# Patient Record
Sex: MALE | Race: WHITE | Employment: OTHER | URBAN - METROPOLITAN AREA
[De-identification: names, ages, dates, MRNs, and addresses within clinical notes are randomized per-mention and may not be internally consistent; named-entity substitution may affect disease eponyms.]

---

## 2019-02-28 ENCOUNTER — OFFICE VISIT (OUTPATIENT)
Dept: OPHTHALMOLOGY | Facility: CLINIC | Age: 74
End: 2019-02-28
Attending: OPHTHALMOLOGY
Payer: MEDICARE

## 2019-02-28 DIAGNOSIS — H40.9 GLAUCOMA: ICD-10-CM

## 2019-02-28 DIAGNOSIS — H25.12 NUCLEAR SENILE CATARACT OF LEFT EYE: ICD-10-CM

## 2019-02-28 DIAGNOSIS — Z96.1 PSEUDOPHAKIA OF RIGHT EYE: ICD-10-CM

## 2019-02-28 DIAGNOSIS — H40.51X3 SECONDARY GLAUCOMA OF RIGHT EYE, SEVERE STAGE: Primary | ICD-10-CM

## 2019-02-28 PROCEDURE — G0463 HOSPITAL OUTPT CLINIC VISIT: HCPCS | Mod: ZF

## 2019-02-28 PROCEDURE — 76514 ECHO EXAM OF EYE THICKNESS: CPT | Mod: ZF | Performed by: OPHTHALMOLOGY

## 2019-02-28 PROCEDURE — 92133 CPTRZD OPH DX IMG PST SGM ON: CPT | Mod: ZF | Performed by: OPHTHALMOLOGY

## 2019-02-28 PROCEDURE — 92083 EXTENDED VISUAL FIELD XM: CPT | Mod: ZF | Performed by: OPHTHALMOLOGY

## 2019-02-28 PROCEDURE — 92020 GONIOSCOPY: CPT | Mod: ZF | Performed by: OPHTHALMOLOGY

## 2019-02-28 RX ORDER — CLOPIDOGREL BISULFATE 75 MG/1
TABLET ORAL
COMMUNITY
Start: 2018-11-21

## 2019-02-28 RX ORDER — HYDROCHLOROTHIAZIDE 25 MG/1
25 TABLET ORAL
COMMUNITY
Start: 2018-10-03

## 2019-02-28 RX ORDER — SIMVASTATIN 20 MG
TABLET ORAL
COMMUNITY

## 2019-02-28 RX ORDER — LATANOPROST 50 UG/ML
1 SOLUTION/ DROPS OPHTHALMIC AT BEDTIME
Qty: 1 BOTTLE | Refills: 11 | Status: SHIPPED | OUTPATIENT
Start: 2019-02-28 | End: 2020-05-15

## 2019-02-28 RX ORDER — ASPIRIN 81 MG/1
81 TABLET ORAL
COMMUNITY

## 2019-02-28 ASSESSMENT — TONOMETRY
OS_IOP_MMHG: 10
IOP_METHOD: TONOPEN
IOP_METHOD: APPLANATION
OD_IOP_MMHG: 26
OD_IOP_MMHG: 24
IOP_METHOD: APPLANATION
OD_IOP_MMHG: 18
OS_IOP_MMHG: 12

## 2019-02-28 ASSESSMENT — VISUAL ACUITY
OD_PH_SC+: -1
OS_SC: 20/30
METHOD: SNELLEN - LINEAR
OS_PH_SC: 20/25
OD_PH_SC: 20/40
OS_PH_SC+: -2
OD_SC+: -1

## 2019-02-28 ASSESSMENT — SLIT LAMP EXAM - LIDS
COMMENTS: NORMAL
COMMENTS: NORMAL

## 2019-02-28 ASSESSMENT — EXTERNAL EXAM - LEFT EYE: OS_EXAM: NORMAL

## 2019-02-28 ASSESSMENT — CUP TO DISC RATIO
OD_RATIO: 0.9
OS_RATIO: 0.2

## 2019-02-28 ASSESSMENT — EXTERNAL EXAM - RIGHT EYE: OD_EXAM: NORMAL

## 2019-02-28 ASSESSMENT — CONF VISUAL FIELD
OD_SUPERIOR_NASAL_RESTRICTION: 1
METHOD: COUNTING FINGERS
OS_NORMAL: 1
OD_INFERIOR_NASAL_RESTRICTION: 1

## 2019-02-28 ASSESSMENT — GONIOSCOPY
OS_INFERIOR: 4
OD_SUPERIOR: 4
OS_NASAL: 4
OS_TEMPORAL: 4
OD_TEMPORAL: 4
OD_NASAL: 4
OS_SUPERIOR: 4
OD_INFERIOR: 4

## 2019-02-28 ASSESSMENT — PACHYMETRY
OD_CT(UM): 627
OS_CT(UM): 606

## 2019-02-28 NOTE — PATIENT INSTRUCTIONS
Will obtain old notes and visual field test from Dr. Vergara and Dr. Bowers.  Patient will start Latanoprost which is a teal top drop at bedtime in the RIGHT EYE ONLY.  Patient will return to clinic in 3-4 weeks with repeat IOP check and further discussion.        This drop may cause lash growth and some darkening of the skin around the eye.  It is also possible in a patient with a freckled iris or denzel eyes, that the iris could darken to brown with time, something that is not common but not reversible.

## 2019-02-28 NOTE — NURSING NOTE
Chief Complaint(s) and History of Present Illness(es)     Glaucoma Evaluation     In right eye.  Associated symptoms include Negative for dryness, eye pain, redness and tearing.              Comments     Pt here for a Glaucoma evaluation of RE. Pt states he had an injury to the RE about 10 years ago, and the IOP has gone up gradually through the years. Vision in RE is not good about 95% gone, LE fine no changes. Pt is not using any eye drops at the moment. RE is cloudy x 10 years or so.     Lee Ann Munoz, [facility] 9:38 AM February 28, 2019

## 2019-02-28 NOTE — PROGRESS NOTES
1)Secondary Glaucoma OD -- s/p Express Trab OD (in 2008 by Dr. Vergara per patient), s/p ?LPI OD -- K pachy: 627/606   Tmax: OD:50s   HVF: OD:[place] and OS: Fluct      CDR:0.9/0.2     HRT/OCT: OD:Severe RNFl thinning and OS:RNFL WNL   FHX of Glc: No     Gonio: open     Intolerant to:      Asthma/COPD: No  Steroid Use: No    Kidney Stones: No    Sulfa Allergy:  No    IOP targets:  2)PCIOL OD  3)NS OS -- on Plavix and ASA  4)H/O Blunt force injury with shelf bracket in 2008 -- was evaluated by Dr. Arana in ER, was then followed by Dr. Bowers prior to seeing Dr. Vergara for surgery per patient  5)H/O AAA s/p repair 2018   6)H/O Cerebral aneurysm s/p coil 2018     MD: last had eye exam in 2010 per patient    Will obtain old notes and visual field test from Dr. Vergara and Dr. Bowers.  Patient will start Latanoprost which is a teal top drop at bedtime in the RIGHT EYE ONLY.  Patient will return to clinic in 3-4 weeks with repeat IOP check and further discussion about next steps.        This drop may cause lash growth and some darkening of the skin around the eye.  It is also possible in a patient with a freckled iris or denzel eyes, that the iris could darken to brown with time, something that is not common but not reversible.      The patient will continue without drops.     The patient will return to clinic in 6 months [facility] right eye.     Brown Acevedo MD  Ophthalmology Resident, PGY-3  [facility]      Attending Physician Attestation:  Complete documentation of historical and exam elements from today's encounter can be found in the full encounter summary report (not reduplicated in this progress note). I personally obtained the chief complaint(s) and history of present illness.  I confirmed and edited as necessary the review of systems, past medical/surgical history, family history, social history, and examination findings as documented by others; and I examined the patient myself. I  personally reviewed the relevant tests, images, and reports as documented above. I formulated and edited as necessary the assessment and plan and discussed the findings and management plan with the patient and family.  - Loli Lindquist MD

## 2019-04-09 ENCOUNTER — OFFICE VISIT (OUTPATIENT)
Dept: OPHTHALMOLOGY | Facility: CLINIC | Age: 74
End: 2019-04-09
Attending: OPHTHALMOLOGY
Payer: MEDICARE

## 2019-04-09 DIAGNOSIS — Z96.1 PSEUDOPHAKIA OF RIGHT EYE: ICD-10-CM

## 2019-04-09 DIAGNOSIS — H25.12 NUCLEAR SENILE CATARACT OF LEFT EYE: ICD-10-CM

## 2019-04-09 DIAGNOSIS — H40.51X3 SECONDARY GLAUCOMA OF RIGHT EYE, SEVERE STAGE: Primary | ICD-10-CM

## 2019-04-09 PROCEDURE — G0463 HOSPITAL OUTPT CLINIC VISIT: HCPCS | Mod: ZF

## 2019-04-09 ASSESSMENT — EXTERNAL EXAM - LEFT EYE: OS_EXAM: NORMAL

## 2019-04-09 ASSESSMENT — CONF VISUAL FIELD
OS_NORMAL: 1
METHOD: COUNTING FINGERS
OD_INFERIOR_TEMPORAL_RESTRICTION: 3
OD_INFERIOR_NASAL_RESTRICTION: 1
OD_SUPERIOR_NASAL_RESTRICTION: 1
OD_SUPERIOR_TEMPORAL_RESTRICTION: 3

## 2019-04-09 ASSESSMENT — VISUAL ACUITY
OD_SC: 20/70
OS_SC: 20/25
METHOD: SNELLEN - LINEAR
OD_PH_SC: 20/60

## 2019-04-09 ASSESSMENT — SLIT LAMP EXAM - LIDS
COMMENTS: NORMAL
COMMENTS: NORMAL

## 2019-04-09 ASSESSMENT — TONOMETRY
IOP_METHOD: APPLANATION
OD_IOP_MMHG: 14
OS_IOP_MMHG: 11

## 2019-04-09 ASSESSMENT — EXTERNAL EXAM - RIGHT EYE: OD_EXAM: NORMAL

## 2019-04-09 NOTE — NURSING NOTE
Chief Complaints and History of Present Illnesses   Patient presents with     Glaucoma Follow-Up     6 week follow up Secondary Glaucoma OD      Chief Complaint(s) and History of Present Illness(es)     Glaucoma Follow-Up     Associated symptoms: Negative for dryness, eye pain and redness    Comments: 6 week follow up Secondary Glaucoma OD               Comments     Pt states vision is the same as last visit. No eye pain today.     Maurizio BOOKER April 9, 2019 12:14 PM

## 2019-04-09 NOTE — PATIENT INSTRUCTIONS
Patient will continue on Latanoprost which is a teal top drop at bedtime in the RIGHT EYE ONLY.  Patient will return to clinic in 3-4 months with repeat IOP check, visual field test (OD:LVC) and further discussion about next steps.        This drop may cause lash growth and some darkening of the skin around the eye.  It is also possible in a patient with a freckled iris or denzel eyes, that the iris could darken to brown with time, something that is not common but not reversible.

## 2019-04-09 NOTE — PROGRESS NOTES
1)Secondary Glaucoma OD -- s/p Express Trab OD (in 2008 by Dr. Vergara per patient), s/p ?LPI OD -- K pachy: 627/606   Tmax: OD:50s   HVF: OD:[place] and OS: Fluct      CDR:0.9/0.2     HRT/OCT: OD:Severe RNFl thinning and OS:RNFL WNL   FHX of Glc: No     Gonio: open     Intolerant to:      Asthma/COPD: No  Steroid Use: No    Kidney Stones: No    Sulfa Allergy:  No    IOP targets:  2)PCIOL OD  3)NS OS -- on Plavix and ASA  4)H/O Blunt force injury with shelf bracket in 2008 -- was evaluated by Dr. Arana in ER, was then followed by Dr. Bowers prior to seeing Dr. Vergara for surgery per patient  5)H/O AAA s/p repair 2018   6)H/O Cerebral aneurysm s/p coil 2018     MD: last had eye exam in 2010 per patient    Will obtain old notes and visual field test from Dr. Vergara and Dr. Bowers.  Patient will continue on Latanoprost which is a teal top drop at bedtime in the RIGHT EYE ONLY.  Patient will return to clinic in 3-4 months with repeat IOP check, visual field test (OD:LVC) and further discussion about next steps.        This drop may cause lash growth and some darkening of the skin around the eye.  It is also possible in a patient with a freckled iris or denzel eyes, that the iris could darken to brown with time, something that is not common but not reversible.      Attending Physician Attestation:  Complete documentation of historical and exam elements from today's encounter can be found in the full encounter summary report (not reduplicated in this progress note). I personally obtained the chief complaint(s) and history of present illness.  I confirmed and edited as necessary the review of systems, past medical/surgical history, family history, social history, and examination findings as documented by others; and I examined the patient myself. I personally reviewed the relevant tests, images, and reports as documented above. I formulated and edited as necessary the assessment and plan and discussed the  findings and management plan with the patient and family.  - Loli Lindquist MD

## 2019-08-13 ENCOUNTER — OFFICE VISIT (OUTPATIENT)
Dept: OPHTHALMOLOGY | Facility: CLINIC | Age: 74
End: 2019-08-13
Attending: OPHTHALMOLOGY
Payer: MEDICARE

## 2019-08-13 DIAGNOSIS — H25.12 NUCLEAR SENILE CATARACT OF LEFT EYE: ICD-10-CM

## 2019-08-13 DIAGNOSIS — H40.51X3 SECONDARY GLAUCOMA OF RIGHT EYE, SEVERE STAGE: Primary | ICD-10-CM

## 2019-08-13 DIAGNOSIS — Z96.1 PSEUDOPHAKIA OF RIGHT EYE: ICD-10-CM

## 2019-08-13 PROCEDURE — G0463 HOSPITAL OUTPT CLINIC VISIT: HCPCS | Mod: ZF

## 2019-08-13 PROCEDURE — 92083 EXTENDED VISUAL FIELD XM: CPT | Mod: ZF | Performed by: OPHTHALMOLOGY

## 2019-08-13 RX ORDER — DORZOLAMIDE HYDROCHLORIDE AND TIMOLOL MALEATE 20; 5 MG/ML; MG/ML
1 SOLUTION/ DROPS OPHTHALMIC 2 TIMES DAILY
Qty: 1 BOTTLE | Refills: 11 | Status: SHIPPED | OUTPATIENT
Start: 2019-08-13

## 2019-08-13 ASSESSMENT — REFRACTION_WEARINGRX
OD_CYLINDER: +0.75
OS_AXIS: 040
OD_SPHERE: PLANO
OS_CYLINDER: +0.50
OS_SPHERE: -0.50
SPECS_TYPE: MRX
OD_AXIS: 016

## 2019-08-13 ASSESSMENT — TONOMETRY
OS_IOP_MMHG: 13
OD_IOP_MMHG: 16
IOP_METHOD: APPLANATION
IOP_METHOD: APPLANATION
OD_IOP_MMHG: 20
IOP_METHOD: APPLANATION
OS_IOP_MMHG: 15
OD_IOP_MMHG: 19

## 2019-08-13 ASSESSMENT — CONF VISUAL FIELD
OD_SUPERIOR_TEMPORAL_RESTRICTION: 3
OS_NORMAL: 1
OD_INFERIOR_NASAL_RESTRICTION: 1
OD_SUPERIOR_NASAL_RESTRICTION: 1
OD_INFERIOR_TEMPORAL_RESTRICTION: 3

## 2019-08-13 ASSESSMENT — SLIT LAMP EXAM - LIDS
COMMENTS: NORMAL
COMMENTS: NORMAL

## 2019-08-13 ASSESSMENT — VISUAL ACUITY
OS_SC: 20/25
OD_SC: 20/70
OS_SC+: -1
OD_SC+: +1
OD_PH_SC: 20/60
OD_PH_SC+: -2
METHOD: SNELLEN - LINEAR

## 2019-08-13 ASSESSMENT — EXTERNAL EXAM - LEFT EYE: OS_EXAM: NORMAL

## 2019-08-13 ASSESSMENT — EXTERNAL EXAM - RIGHT EYE: OD_EXAM: NORMAL

## 2019-08-13 NOTE — NURSING NOTE
Chief Complaints and History of Present Illnesses   Patient presents with     Glaucoma Follow-Up     4 month follow up Secondary Glaucoma OD      Chief Complaint(s) and History of Present Illness(es)     Glaucoma Follow-Up     Comments: 4 month follow up Secondary Glaucoma OD               Comments     Pt states vision is the same as last visit. No eye pain today.  No flashes or floaters.    Maurizio BOOKER August 13, 2019 12:48 PM

## 2019-08-13 NOTE — PROGRESS NOTES
1)Secondary Glaucoma OD -- s/p Express Trab OD (in 2008 by Dr. Vergara per patient), s/p ?LPI OD -- K pachy: 627/606   Tmax: OD:50s   HVF: OD:[place] and OS: Fluct      CDR:0.9/0.2     HRT/OCT: OD:Severe RNFl thinning and OS:RNFL WNL   FHX of Glc: No     Gonio: open     Intolerant to:      Asthma/COPD: No  Steroid Use: No    Kidney Stones: No    Sulfa Allergy:  No    IOP targets:  2)PCIOL OD  3)NS OS -- on Plavix and ASA  4)H/O Blunt force injury with shelf bracket in 2008 -- was evaluated by Dr. Arana in ER, was then followed by Dr. Bowers prior to seeing Dr. Vergara for surgery per patient  5)H/O AAA s/p repair 2018   6)H/O Cerebral aneurysm s/p coil 2018     MD: last had eye exam in 2010 per patient    Will obtain old notes and visual field test from Dr. Vergara and Dr. Bowers.  Patient will continue on Latanoprost which is a teal top drop at bedtime in the RIGHT EYE ONLY.  Patient will return to clinic in 3-4 months with repeat IOP check, visual field test (OD:LVC), dilated eye exam and disc photos.  A long discussion of the risks, benefits, and alternatives including potential treatment and management options were had with patient and a decision was made to start Cosopt (Timolol/Dorzolamide) which is a blue top drop 2x/day (12 hours apart) in the RIGHT EYE ONLY.          This drop may cause lash growth and some darkening of the skin around the eye.  It is also possible in a patient with a freckled iris or denzel eyes, that the iris could darken to brown with time, something that is not common but not reversible.      Attending Physician Attestation:  Complete documentation of historical and exam elements from today's encounter can be found in the full encounter summary report (not reduplicated in this progress note). I personally obtained the chief complaint(s) and history of present illness.  I confirmed and edited as necessary the review of systems, past medical/surgical history, family history,  social history, and examination findings as documented by others; and I examined the patient myself. I personally reviewed the relevant tests, images, and reports as documented above. I formulated and edited as necessary the assessment and plan and discussed the findings and management plan with the patient and family.  - Loli Lindquist MD

## 2019-08-13 NOTE — PATIENT INSTRUCTIONS
Patient will continue on Latanoprost which is a teal top drop at bedtime in the RIGHT EYE ONLY.  Patient will return to clinic in 3-4 months with repeat IOP check, visual field test (OD:LVC), dilated eye exam and disc photos.  A long discussion of the risks, benefits, and alternatives including potential treatment and management options were had with patient and a decision was made to start Cosopt (Timolol/Dorzolamide) which is a blue top drop 2x/day (12 hours apart) in the RIGHT EYE ONLY.          This drop may cause lash growth and some darkening of the skin around the eye.  It is also possible in a patient with a freckled iris or denzle eyes, that the iris could darken to brown with time, something that is not common but not reversible.

## 2020-05-14 DIAGNOSIS — H40.51X3 SECONDARY GLAUCOMA OF RIGHT EYE, SEVERE STAGE: ICD-10-CM

## 2020-05-15 RX ORDER — LATANOPROST 50 UG/ML
1 SOLUTION/ DROPS OPHTHALMIC AT BEDTIME
Qty: 1 BOTTLE | Refills: 2 | Status: SHIPPED | OUTPATIENT
Start: 2020-05-15 | End: 2020-06-04

## 2020-05-15 NOTE — TELEPHONE ENCOUNTER
Medication: latanoprost (XALATAN) 0.005 % ophthalmic solution    Diagnosis: Secondary glaucoma of right eye, severe stage    Requested directions: same  Current directions on the medication list: Place 1 drop into the right eye At Bedtime     Last Written Prescription Date:  2/28/19  Last Fill Quantity: 1 bottle,   # refills: 11    Last Office Visit: 8/13/19  Future Office visit: none    Attending Provider: Loli Lindquist MD  Last Clinic Note: 8/13/19  Plan:   -  continue on Latanoprost which is a teal top drop at bedtime in the RIGHT EYE ONLY  - return to clinic in 3-4 months with repeat IOP check, visual field test (OD:LVC), dilated eye exam and disc photos     A long discussion of the risks, benefits, and alternatives including potential treatment and management options were had with patient and a decision was made to start Cosopt (Timolol/Dorzolamide) which is a blue top drop 2x/day (12 hours apart) in the RIGHT EYE ONLY.        Routing to scheduling for appointment.

## 2020-06-04 DIAGNOSIS — H40.51X3 SECONDARY GLAUCOMA OF RIGHT EYE, SEVERE STAGE: ICD-10-CM

## 2020-06-04 RX ORDER — LATANOPROST 50 UG/ML
1 SOLUTION/ DROPS OPHTHALMIC AT BEDTIME
Qty: 1 BOTTLE | Refills: 3 | Status: SHIPPED | OUTPATIENT
Start: 2020-06-04

## 2020-06-04 NOTE — TELEPHONE ENCOUNTER
LATANOPROST 0.005% EYE DROPS   Dx:  Secondary glaucoma of right eye, severe stage      Requested directions: Place 1 drop into the right eye At Bedtime - Right Eye   Current directions on the medication list:  Place 1 drop into the right eye At Bedtime - Right Eye     Last Written Prescription Date:  5/15/2020  Last Fill Quantity: 1,   # refills: 2    Last Office Visit:  8/13/2019  Future Office visit:  8/230/2020    Attending Provider:    Loli Lindquist MD   Ophthalmology    Last Clinic Note:  8/13/2020    Will obtain old notes and visual field test from Dr. Vergara and Dr. Bowers.  Patient will continue on Latanoprost which is a teal top drop at bedtime in the RIGHT EYE ONLY.  Patient will return to clinic in 3-4 months with repeat IOP check, visual field test (OD:LVC), dilated eye exam and disc photos.  A long discussion of the risks, benefits, and alternatives including potential treatment and management options were had with patient and a decision was made to start Cosopt (Timolol/Dorzolamide) which is a blue top drop 2x/day (12 hours apart) in the RIGHT EYE ONLY.            This drop may cause lash growth and some darkening of the skin around the eye.  It is also possible in a patient with a freckled iris or denzel eyes, that the iris could darken to brown with time, something that is not common but not reversible.    1 Bottle, 3 Refills sent to pharm 6/4/2020    Teagan Painting RN  Central Triage Red Flags/Med Refills